# Patient Record
Sex: MALE | URBAN - METROPOLITAN AREA
[De-identification: names, ages, dates, MRNs, and addresses within clinical notes are randomized per-mention and may not be internally consistent; named-entity substitution may affect disease eponyms.]

---

## 2017-02-15 ENCOUNTER — IMPORTED ENCOUNTER (OUTPATIENT)
Dept: URBAN - METROPOLITAN AREA CLINIC 31 | Facility: CLINIC | Age: 72
End: 2017-02-15

## 2017-02-15 PROBLEM — B02.33: Noted: 2017-02-15

## 2017-02-15 PROCEDURE — 99204 OFFICE O/P NEW MOD 45 MIN: CPT

## 2017-02-15 NOTE — PATIENT DISCUSSION
1.  Zoster keratitis: Start Brooke Bull 5x/d for a week 3x/d for 3-4 days then DC Pred decrease to 2x/d add Valacyclovir 500mg qd. REfill Tylenol #3 for pain if persists refer to pain specialist.  Going back to [place] at end of month. 2.  Return for an appointment in 2 weeks for office call. with Dr. Tejas Shelby.

## 2017-02-28 ENCOUNTER — IMPORTED ENCOUNTER (OUTPATIENT)
Dept: URBAN - METROPOLITAN AREA CLINIC 31 | Facility: CLINIC | Age: 72
End: 2017-02-28

## 2017-02-28 PROBLEM — B02.33: Noted: 2017-02-28

## 2017-02-28 PROCEDURE — 99213 OFFICE O/P EST LOW 20 MIN: CPT

## 2017-02-28 NOTE — PATIENT DISCUSSION
Zoster keratitis: Improving unable to tolerate oral Valacyclovir. Zirgan 2x/d for 4 days then qd tears 4x/d PRED 2x/d. Driving back to [place] will refer to Dr Frederick Rodríguez.

## 2017-05-28 PROBLEM — D63.1 ANEMIA IN CHRONIC KIDNEY DISEASE: Status: ACTIVE | Noted: 2017-05-28

## 2017-05-28 PROBLEM — D84.9 IMMUNOSUPPRESSED STATUS (HCC): Status: ACTIVE | Noted: 2017-05-28

## 2017-05-28 PROBLEM — N18.9 ANEMIA IN CHRONIC KIDNEY DISEASE: Status: ACTIVE | Noted: 2017-05-28

## 2017-05-28 PROBLEM — Z94.0 HISTORY OF KIDNEY TRANSPLANT: Status: ACTIVE | Noted: 2017-05-28

## 2017-05-28 PROBLEM — D53.9 MACROCYTIC ANEMIA: Status: ACTIVE | Noted: 2017-05-28

## 2017-05-31 ENCOUNTER — OFFICE VISIT (OUTPATIENT)
Dept: CARDIOLOGY CLINIC | Age: 72
End: 2017-05-31

## 2017-05-31 VITALS
HEIGHT: 72 IN | BODY MASS INDEX: 21.94 KG/M2 | SYSTOLIC BLOOD PRESSURE: 130 MMHG | OXYGEN SATURATION: 98 % | DIASTOLIC BLOOD PRESSURE: 52 MMHG | HEART RATE: 60 BPM | WEIGHT: 162 LBS

## 2017-05-31 DIAGNOSIS — R60.0 LOCALIZED EDEMA: Primary | ICD-10-CM

## 2017-05-31 DIAGNOSIS — E78.49 OTHER HYPERLIPIDEMIA: ICD-10-CM

## 2017-05-31 DIAGNOSIS — I65.23 OCCLUSION AND STENOSIS OF BILATERAL CAROTID ARTERIES: ICD-10-CM

## 2017-05-31 DIAGNOSIS — R07.89 OTHER CHEST PAIN: ICD-10-CM

## 2017-05-31 DIAGNOSIS — E11.9 TYPE 2 DIABETES MELLITUS WITHOUT COMPLICATION, UNSPECIFIED LONG TERM INSULIN USE STATUS: ICD-10-CM

## 2017-05-31 PROCEDURE — G8427 DOCREV CUR MEDS BY ELIG CLIN: HCPCS | Performed by: NURSE PRACTITIONER

## 2017-05-31 PROCEDURE — 93000 ELECTROCARDIOGRAM COMPLETE: CPT | Performed by: NURSE PRACTITIONER

## 2017-05-31 PROCEDURE — 4040F PNEUMOC VAC/ADMIN/RCVD: CPT | Performed by: NURSE PRACTITIONER

## 2017-05-31 PROCEDURE — 3046F HEMOGLOBIN A1C LEVEL >9.0%: CPT | Performed by: NURSE PRACTITIONER

## 2017-05-31 PROCEDURE — 1123F ACP DISCUSS/DSCN MKR DOCD: CPT | Performed by: NURSE PRACTITIONER

## 2017-05-31 PROCEDURE — G8599 NO ASA/ANTIPLAT THER USE RNG: HCPCS | Performed by: NURSE PRACTITIONER

## 2017-05-31 PROCEDURE — 99214 OFFICE O/P EST MOD 30 MIN: CPT | Performed by: NURSE PRACTITIONER

## 2017-05-31 PROCEDURE — 3017F COLORECTAL CA SCREEN DOC REV: CPT | Performed by: NURSE PRACTITIONER

## 2017-05-31 PROCEDURE — G8419 CALC BMI OUT NRM PARAM NOF/U: HCPCS | Performed by: NURSE PRACTITIONER

## 2017-05-31 PROCEDURE — 1036F TOBACCO NON-USER: CPT | Performed by: NURSE PRACTITIONER

## 2017-06-28 ENCOUNTER — TELEPHONE (OUTPATIENT)
Dept: CARDIOLOGY CLINIC | Age: 72
End: 2017-06-28

## 2017-06-28 ENCOUNTER — OFFICE VISIT (OUTPATIENT)
Dept: CARDIOLOGY CLINIC | Age: 72
End: 2017-06-28

## 2017-06-28 VITALS
HEIGHT: 72 IN | HEART RATE: 56 BPM | DIASTOLIC BLOOD PRESSURE: 56 MMHG | BODY MASS INDEX: 21.4 KG/M2 | OXYGEN SATURATION: 98 % | SYSTOLIC BLOOD PRESSURE: 128 MMHG | WEIGHT: 158 LBS

## 2017-06-28 DIAGNOSIS — I65.23 OCCLUSION AND STENOSIS OF BILATERAL CAROTID ARTERIES: Primary | ICD-10-CM

## 2017-06-28 DIAGNOSIS — R07.89 OTHER CHEST PAIN: ICD-10-CM

## 2017-06-28 DIAGNOSIS — R60.0 LOCALIZED EDEMA: ICD-10-CM

## 2017-06-28 DIAGNOSIS — E78.49 OTHER HYPERLIPIDEMIA: ICD-10-CM

## 2017-06-28 DIAGNOSIS — E11.9 TYPE 2 DIABETES MELLITUS WITHOUT COMPLICATION, UNSPECIFIED LONG TERM INSULIN USE STATUS: ICD-10-CM

## 2017-06-28 PROCEDURE — 1123F ACP DISCUSS/DSCN MKR DOCD: CPT | Performed by: NURSE PRACTITIONER

## 2017-06-28 PROCEDURE — 3017F COLORECTAL CA SCREEN DOC REV: CPT | Performed by: NURSE PRACTITIONER

## 2017-06-28 PROCEDURE — 99214 OFFICE O/P EST MOD 30 MIN: CPT | Performed by: NURSE PRACTITIONER

## 2017-06-28 PROCEDURE — G8427 DOCREV CUR MEDS BY ELIG CLIN: HCPCS | Performed by: NURSE PRACTITIONER

## 2017-06-28 PROCEDURE — G8599 NO ASA/ANTIPLAT THER USE RNG: HCPCS | Performed by: NURSE PRACTITIONER

## 2017-06-28 PROCEDURE — G8420 CALC BMI NORM PARAMETERS: HCPCS | Performed by: NURSE PRACTITIONER

## 2017-06-28 PROCEDURE — 4040F PNEUMOC VAC/ADMIN/RCVD: CPT | Performed by: NURSE PRACTITIONER

## 2017-06-28 PROCEDURE — 3046F HEMOGLOBIN A1C LEVEL >9.0%: CPT | Performed by: NURSE PRACTITIONER

## 2017-06-28 PROCEDURE — 1036F TOBACCO NON-USER: CPT | Performed by: NURSE PRACTITIONER

## 2017-07-21 ENCOUNTER — TELEPHONE (OUTPATIENT)
Dept: CARDIOLOGY CLINIC | Age: 72
End: 2017-07-21

## 2017-07-21 DIAGNOSIS — E78.5 HYPERLIPIDEMIA, UNSPECIFIED HYPERLIPIDEMIA TYPE: Primary | ICD-10-CM

## 2017-08-07 ENCOUNTER — TELEPHONE (OUTPATIENT)
Dept: CARDIOLOGY CLINIC | Age: 72
End: 2017-08-07

## 2017-12-11 ENCOUNTER — OFFICE VISIT (OUTPATIENT)
Dept: CARDIOLOGY CLINIC | Age: 72
End: 2017-12-11

## 2017-12-11 VITALS
SYSTOLIC BLOOD PRESSURE: 138 MMHG | HEART RATE: 56 BPM | BODY MASS INDEX: 23.57 KG/M2 | DIASTOLIC BLOOD PRESSURE: 60 MMHG | WEIGHT: 174 LBS | HEIGHT: 72 IN

## 2017-12-11 DIAGNOSIS — E11.9 TYPE 2 DIABETES MELLITUS WITHOUT COMPLICATION, UNSPECIFIED LONG TERM INSULIN USE STATUS: ICD-10-CM

## 2017-12-11 DIAGNOSIS — E78.2 MIXED HYPERLIPIDEMIA: ICD-10-CM

## 2017-12-11 DIAGNOSIS — I10 ESSENTIAL HYPERTENSION: Primary | ICD-10-CM

## 2017-12-11 DIAGNOSIS — I65.23 OCCLUSION AND STENOSIS OF BILATERAL CAROTID ARTERIES: ICD-10-CM

## 2017-12-11 DIAGNOSIS — R60.0 LOCALIZED EDEMA: ICD-10-CM

## 2017-12-11 PROCEDURE — 1123F ACP DISCUSS/DSCN MKR DOCD: CPT | Performed by: NURSE PRACTITIONER

## 2017-12-11 PROCEDURE — 3046F HEMOGLOBIN A1C LEVEL >9.0%: CPT | Performed by: NURSE PRACTITIONER

## 2017-12-11 PROCEDURE — 1036F TOBACCO NON-USER: CPT | Performed by: NURSE PRACTITIONER

## 2017-12-11 PROCEDURE — G8427 DOCREV CUR MEDS BY ELIG CLIN: HCPCS | Performed by: NURSE PRACTITIONER

## 2017-12-11 PROCEDURE — 3017F COLORECTAL CA SCREEN DOC REV: CPT | Performed by: NURSE PRACTITIONER

## 2017-12-11 PROCEDURE — G8599 NO ASA/ANTIPLAT THER USE RNG: HCPCS | Performed by: NURSE PRACTITIONER

## 2017-12-11 PROCEDURE — 4040F PNEUMOC VAC/ADMIN/RCVD: CPT | Performed by: NURSE PRACTITIONER

## 2017-12-11 PROCEDURE — 99214 OFFICE O/P EST MOD 30 MIN: CPT | Performed by: NURSE PRACTITIONER

## 2017-12-11 PROCEDURE — G8420 CALC BMI NORM PARAMETERS: HCPCS | Performed by: NURSE PRACTITIONER

## 2017-12-11 PROCEDURE — G8484 FLU IMMUNIZE NO ADMIN: HCPCS | Performed by: NURSE PRACTITIONER

## 2017-12-11 RX ORDER — OMEPRAZOLE 20 MG/1
20 CAPSULE, DELAYED RELEASE ORAL 2 TIMES DAILY
COMMUNITY

## 2017-12-11 NOTE — PROGRESS NOTES
[facility]     Outpatient Follow Up Note    CHIEF COMPLAINT / HPI:  Follow Up secondary to edema/ HTN/  hyperlipidemia/ diabetes       Margi Epstein is 67 y.o. male who presents today for a routine follow  related to the above mentioned issues. Subjective:   Since the time of last office visit the patient admits their symptoms have worsening. HPI: Patient has a history of eft kidney transplant (7/2007) on immunosuppressant medications for this (mycophenolate and sirolimus), chronic renal insufficiency and history of gastric bypass surgery (2/2007). Patient is being seen today due to routine follow up. He is doing well. He denies any chest pain, palpitations, SOB, dizziness, or edema. His weight is stable. His history of kidney transplant patient is followed per renal every two months. They closely monitored his lab work. He is followed per hematology due to anemia. With regard to medication therapy the patient has been compliant with prescribed regimen. They have tolerated therapy to date.      Past Medical History:   Diagnosis Date    Diabetes mellitus ([facility] [address]     Hyperlipidemia     Hypertension     Kidney failure     Shingles 01/2017    Skin cancer      Social History:    History   Smoking Status    Former Smoker   Smokeless Tobacco    Not on file     Current Medications:  Current Outpatient Prescriptions   Medication Sig Dispense Refill    EPOETIN ESTEBAN IJ Inject as directed Indications: not sure on dosage      calcium carbonate (OYSTER SHELL CALCIUM 500 MG) 1250 (500 CA) MG tablet Take 1 tablet by mouth daily      acetaminophen (TYLENOL) 325 MG tablet Take 650 mg by mouth every 6 hours as needed for Pain      gabapentin (NEURONTIN) 100 MG capsule Take 100 mg by mouth 3 times daily      prednisoLONE acetate (PRED FORTE) 1 % ophthalmic suspension 1 drop 4 times daily      amLODIPine (NORVASC) 10 MG tablet Take 10 mg by mouth daily      Multiple Vitamins-Minerals (OCUVITE PRESERVISION PO) Take 1 tablet by mouth 2 times daily      sirolimus (RAPAMUNE) 1 MG tablet Take 2 mg by mouth daily       benazepril (LOTENSIN) 20 MG tablet Take 20 mg by mouth daily.  hydrochlorothiazide (HYDRODIURIL) 25 MG tablet Take 12.5 mg by mouth daily.  sodium bicarbonate 650 MG tablet Take 650 mg by mouth 3 times daily.  ammonium lactate (LAC-HYDRIN) 12 % lotion Apply  topically as needed for Dry Skin. Apply topically as needed.  vitamin B-12 (CYANOCOBALAMIN) 500 MCG tablet Take 500 mcg by mouth daily.  Cholecalciferol (VITAMIN D3) 2000 UNITS CAPS Take by mouth daily       sildenafil (VIAGRA) 50 MG tablet Take 25 mg by mouth as needed for Erectile Dysfunction.  diphenoxylate-atropine (LOMOTIL) 2.5-0.025 MG per tablet Take 1 tablet by mouth 2 times daily       aspirin EC 81 MG EC tablet Take 1 tablet by mouth daily. 30 tablet 3    predniSONE (DELTASONE) 1 MG tablet Take 2.5 mg by mouth daily       mycophenolate (CELLCEPT) 250 MG capsule Take 500 mg by mouth 2 times daily.  pantoprazole (PROTONIX) 40 MG tablet Take 20 mg by mouth 2 times daily.  carvedilol (COREG) 6.25 MG tablet Take 6.25 mg by mouth 2 times daily       Multiple Vitamins-Minerals (MULTIVITAMIN,TX-MINERALS) tablet Take 1 tablet by mouth daily.  folic acid (FOLVITE) 1 MG tablet Take 400 mcg by mouth daily.  ferrous sulfate 325 (65 FE) MG tablet Take 130 mg by mouth daily        No current facility-administered medications for this visit. REVIEW OF SYSTEMS:   CONSTITUTIONAL: No major weight gain or loss, fatigue, weakness, night sweats or fever. There's been no change in energy level, sleep pattern, or activity level. HEENT: No new vision difficulties or ringing in the ears. RESPIRATORY: No new SOB, PND, orthopnea or cough. CARDIOVASCULAR: See HPI  GI: No nausea, vomiting, diarrhea, constipation, abdominal pain or changes in bowel habits.   : No urinary frequency, 08/01/2017     Lab Results   Component Value Date    HDL 52 08/01/2017     Lab Results   Component Value Date    LDLCALC 69 08/01/2017     No results found for: LABVLDL  Radiology Review:  Pertinent images / reports were reviewed as a part of this visit and reveals the following:  ECHO : 6/ 2017  LV function normal : 55-60%  Mild LVH  Minimal MR  Minimal TR     Myoview stress test 2015    Conclusions        Summary    Normal myocardial perfusion study.    Normal LV function.    One 4 beat run of asymptomatic ventricular tachycardia. 5/31/17 EKG showed SB with 1st AV block no change from prior EKG in 2014- reviewed by me     Assessment:   Edema- patient denies any edema at today's visit,    Recent labs showed creat 1.8, patient s/p renal transplant- followed per renal  Currently on HCTZ- managed per renal    Hypertension  Today's B/P- 138/60  Stable  Continue current medications    [facility]     Diabetes--  [facility]     Carotid disease--2012 bilateral less than 40%                        Carotids 12/2014: 16-49% bilateral ICA's                        Carotids 6/2016:  <50% bilateral ICA's                       Carotid 6/17: < 50% right ICA, 50-69% left ICA                       Carotid  12/17: < 50 % BICA                        Plan: repeat carotid ultrasound  in one year     Chest pain-- GXT myoview 12/2015 nml perfusion, nml EF, 4 beats of asymptomatic VT                        Denies any anginal symptoms     Patient is stable since last office visit    Plan:   Continue current medications  Follow up in six months      I have addressed the patient's cardiac risk factors and adjusted pharmacologic treatment as needed. In addition, I have reinforced the need for patient directed risk factor modification. Further evaluation will be based upon the patient's clinical course and testing results. All questions and concerns were addressed to the patient/family.  Alternatives to treatment were discussed. The patient  currently  is not smoking. The risks related to smoking were reviewed with the patient. Recommend maintaining a smoke-free lifestyle. Products available for smoking cessation were discussed. Thank you for allowing to us to participate in the care of Dayna Spain CNP    Mail letter sent to PCP

## 2018-06-19 ENCOUNTER — OFFICE VISIT (OUTPATIENT)
Dept: CARDIOLOGY CLINIC | Age: 73
End: 2018-06-19

## 2018-06-19 VITALS
HEART RATE: 56 BPM | WEIGHT: 167.7 LBS | BODY MASS INDEX: 22.71 KG/M2 | HEIGHT: 72 IN | SYSTOLIC BLOOD PRESSURE: 118 MMHG | DIASTOLIC BLOOD PRESSURE: 60 MMHG

## 2018-06-19 DIAGNOSIS — I73.9 PVD (PERIPHERAL VASCULAR DISEASE) (HCC): ICD-10-CM

## 2018-06-19 DIAGNOSIS — I65.23 OCCLUSION AND STENOSIS OF BILATERAL CAROTID ARTERIES: Primary | ICD-10-CM

## 2018-06-19 DIAGNOSIS — E78.2 MIXED HYPERLIPIDEMIA: ICD-10-CM

## 2018-06-19 DIAGNOSIS — I10 ESSENTIAL HYPERTENSION: ICD-10-CM

## 2018-06-19 PROCEDURE — 99213 OFFICE O/P EST LOW 20 MIN: CPT | Performed by: INTERNAL MEDICINE

## 2018-06-19 PROCEDURE — G8420 CALC BMI NORM PARAMETERS: HCPCS | Performed by: INTERNAL MEDICINE

## 2018-06-19 PROCEDURE — G8599 NO ASA/ANTIPLAT THER USE RNG: HCPCS | Performed by: INTERNAL MEDICINE

## 2018-06-19 PROCEDURE — 1036F TOBACCO NON-USER: CPT | Performed by: INTERNAL MEDICINE

## 2018-06-19 PROCEDURE — 4040F PNEUMOC VAC/ADMIN/RCVD: CPT | Performed by: INTERNAL MEDICINE

## 2018-06-19 PROCEDURE — G8427 DOCREV CUR MEDS BY ELIG CLIN: HCPCS | Performed by: INTERNAL MEDICINE

## 2018-06-19 PROCEDURE — 1123F ACP DISCUSS/DSCN MKR DOCD: CPT | Performed by: INTERNAL MEDICINE

## 2018-06-19 PROCEDURE — 3017F COLORECTAL CA SCREEN DOC REV: CPT | Performed by: INTERNAL MEDICINE

## 2018-07-03 ENCOUNTER — TELEPHONE (OUTPATIENT)
Dept: CARDIOLOGY CLINIC | Age: 73
End: 2018-07-03

## 2019-02-15 ENCOUNTER — OFFICE VISIT (OUTPATIENT)
Dept: CARDIOLOGY CLINIC | Age: 74
End: 2019-02-15
Payer: MEDICARE

## 2019-02-15 VITALS
BODY MASS INDEX: 23.57 KG/M2 | HEIGHT: 72 IN | SYSTOLIC BLOOD PRESSURE: 138 MMHG | DIASTOLIC BLOOD PRESSURE: 58 MMHG | WEIGHT: 174 LBS | HEART RATE: 52 BPM

## 2019-02-15 DIAGNOSIS — E11.9 TYPE 2 DIABETES MELLITUS WITHOUT COMPLICATION, UNSPECIFIED WHETHER LONG TERM INSULIN USE (HCC): ICD-10-CM

## 2019-02-15 DIAGNOSIS — R07.89 OTHER CHEST PAIN: ICD-10-CM

## 2019-02-15 DIAGNOSIS — E78.2 MIXED HYPERLIPIDEMIA: ICD-10-CM

## 2019-02-15 DIAGNOSIS — I10 ESSENTIAL HYPERTENSION: Primary | ICD-10-CM

## 2019-02-15 DIAGNOSIS — I73.9 PVD (PERIPHERAL VASCULAR DISEASE) (HCC): ICD-10-CM

## 2019-02-15 PROCEDURE — G8484 FLU IMMUNIZE NO ADMIN: HCPCS | Performed by: INTERNAL MEDICINE

## 2019-02-15 PROCEDURE — 4040F PNEUMOC VAC/ADMIN/RCVD: CPT | Performed by: INTERNAL MEDICINE

## 2019-02-15 PROCEDURE — 1101F PT FALLS ASSESS-DOCD LE1/YR: CPT | Performed by: INTERNAL MEDICINE

## 2019-02-15 PROCEDURE — G8420 CALC BMI NORM PARAMETERS: HCPCS | Performed by: INTERNAL MEDICINE

## 2019-02-15 PROCEDURE — 99214 OFFICE O/P EST MOD 30 MIN: CPT | Performed by: INTERNAL MEDICINE

## 2019-02-15 PROCEDURE — G8599 NO ASA/ANTIPLAT THER USE RNG: HCPCS | Performed by: INTERNAL MEDICINE

## 2019-02-15 PROCEDURE — 1036F TOBACCO NON-USER: CPT | Performed by: INTERNAL MEDICINE

## 2019-02-15 PROCEDURE — 3046F HEMOGLOBIN A1C LEVEL >9.0%: CPT | Performed by: INTERNAL MEDICINE

## 2019-02-15 PROCEDURE — 2022F DILAT RTA XM EVC RTNOPTHY: CPT | Performed by: INTERNAL MEDICINE

## 2019-02-15 PROCEDURE — G8427 DOCREV CUR MEDS BY ELIG CLIN: HCPCS | Performed by: INTERNAL MEDICINE

## 2019-02-15 PROCEDURE — 1123F ACP DISCUSS/DSCN MKR DOCD: CPT | Performed by: INTERNAL MEDICINE

## 2019-02-15 PROCEDURE — 3017F COLORECTAL CA SCREEN DOC REV: CPT | Performed by: INTERNAL MEDICINE

## 2019-02-19 ENCOUNTER — PROCEDURE VISIT (OUTPATIENT)
Dept: CARDIOLOGY CLINIC | Age: 74
End: 2019-02-19

## 2019-02-19 DIAGNOSIS — R07.89 OTHER CHEST PAIN: ICD-10-CM

## 2019-02-19 LAB
LV EF: 50 %
LVEF MODALITY: NORMAL

## 2020-01-31 ENCOUNTER — OFFICE VISIT (OUTPATIENT)
Dept: CARDIOLOGY CLINIC | Age: 75
End: 2020-01-31
Payer: MEDICARE

## 2020-01-31 ENCOUNTER — TELEPHONE (OUTPATIENT)
Dept: CARDIOLOGY CLINIC | Age: 75
End: 2020-01-31

## 2020-01-31 VITALS
WEIGHT: 163.1 LBS | HEART RATE: 46 BPM | OXYGEN SATURATION: 95 % | DIASTOLIC BLOOD PRESSURE: 60 MMHG | SYSTOLIC BLOOD PRESSURE: 120 MMHG | BODY MASS INDEX: 22.09 KG/M2 | HEIGHT: 72 IN

## 2020-01-31 PROCEDURE — 3017F COLORECTAL CA SCREEN DOC REV: CPT | Performed by: INTERNAL MEDICINE

## 2020-01-31 PROCEDURE — 4040F PNEUMOC VAC/ADMIN/RCVD: CPT | Performed by: INTERNAL MEDICINE

## 2020-01-31 PROCEDURE — 1123F ACP DISCUSS/DSCN MKR DOCD: CPT | Performed by: INTERNAL MEDICINE

## 2020-01-31 PROCEDURE — 1036F TOBACCO NON-USER: CPT | Performed by: INTERNAL MEDICINE

## 2020-01-31 PROCEDURE — 93000 ELECTROCARDIOGRAM COMPLETE: CPT | Performed by: INTERNAL MEDICINE

## 2020-01-31 PROCEDURE — G8484 FLU IMMUNIZE NO ADMIN: HCPCS | Performed by: INTERNAL MEDICINE

## 2020-01-31 PROCEDURE — 99213 OFFICE O/P EST LOW 20 MIN: CPT | Performed by: INTERNAL MEDICINE

## 2020-01-31 PROCEDURE — G8427 DOCREV CUR MEDS BY ELIG CLIN: HCPCS | Performed by: INTERNAL MEDICINE

## 2020-01-31 PROCEDURE — G8420 CALC BMI NORM PARAMETERS: HCPCS | Performed by: INTERNAL MEDICINE

## 2020-01-31 RX ORDER — CARVEDILOL 3.12 MG/1
6.25 TABLET ORAL 2 TIMES DAILY
Qty: 90 TABLET | Refills: 3 | Status: SHIPPED | OUTPATIENT
Start: 2020-01-31 | End: 2020-01-31

## 2020-01-31 RX ORDER — CARVEDILOL 3.12 MG/1
3.12 TABLET ORAL 2 TIMES DAILY
Qty: 180 TABLET | Refills: 3 | Status: SHIPPED | OUTPATIENT
Start: 2020-01-31 | End: 2022-02-18 | Stop reason: SINTOL

## 2020-01-31 NOTE — PROGRESS NOTES
[facility]   Cardiac Follow up    Referring Provider:  Shereen Oliva     Chief Complaint   Patient presents with    1 Year Follow Up     Patient return to office for yearly visit     Hypertension    Hyperlipidemia        History of Present Illness:  Mr. Danika Couch is a 75 y.o. gentleman here today in follow up. He has a history of hypertension, hyperlipidemia, diabetes, had a renal transplant in 2007. Also has mild carotid disease. [place] [facility] [place] and walks 3 miles on the track. He lifts weights on Mon Wed and Fri. He has no change in exercise tolerance. Today he states he is doing well. He has no chest pain,sob palpitations or dizziness. Does have some fatigue . He has no orhopnea    Patient is compliant  with medication and is tolerating . Has not had any spontaneous bleeding, but does have bruising      Past Medical History:   has a past medical history of Diabetes mellitus ([address] Hyperlipidemia, Hypertension, Kidney failure, Shingles, and Skin cancer. Surgical History:   has a past surgical history that includes hernia repair; Gastric bypass surgery; Appendectomy; Cholecystectomy; Cataract removal; Kidney removal; and Prostate biopsy. Social History:   reports that he has quit smoking. He has never used smokeless tobacco. He reports current alcohol use. He reports that he does not use drugs. Family History:  family history includes Diabetes in his brother. Home Medications:  Outpatient Encounter Medications as of 1/31/2020   Medication Sig Dispense Refill    bevacizumab (AVASTIN) 2.5 mg/0.1 ml syringe Inject 2.5 mg into the eye once Shot in right eye every 4 wks.       omeprazole (PRILOSEC) 20 MG delayed release capsule Take 20 mg by mouth 2 times daily      EPOETIN ESTEBAN IJ Inject as directed Indications: not sure on dosage      calcium carbonate (OYSTER SHELL CALCIUM 500 MG) 1250 (500 CA) MG tablet Take 1 tablet by mouth daily      acetaminophen (TYLENOL) 325 MG tablet Take 650 mg by mouth every 6 hours as needed for Pain      gabapentin (NEURONTIN) 100 MG capsule Take 100 mg by mouth 3 times daily      amLODIPine (NORVASC) 10 MG tablet Take 5 mg by mouth daily       Multiple Vitamins-Minerals (OCUVITE PRESERVISION PO) Take 1 tablet by mouth 2 times daily      sirolimus (RAPAMUNE) 1 MG tablet Take 1 mg by mouth 4 times daily       benazepril (LOTENSIN) 20 MG tablet Take 20 mg by mouth daily.  hydrochlorothiazide (HYDRODIURIL) 25 MG tablet Take 12.5 mg by mouth daily.  sodium bicarbonate 650 MG tablet Take 650 mg by mouth 3 times daily.  ammonium lactate (LAC-HYDRIN) 12 % lotion Apply  topically as needed for Dry Skin. Apply topically as needed.  vitamin B-12 (CYANOCOBALAMIN) 500 MCG tablet Take 500 mcg by mouth daily.  Cholecalciferol (VITAMIN D3) 2000 UNITS CAPS Take by mouth daily       diphenoxylate-atropine (LOMOTIL) 2.5-0.025 MG per tablet Take 1 tablet by mouth 2 times daily       aspirin EC 81 MG EC tablet Take 1 tablet by mouth daily. 30 tablet 3    predniSONE (DELTASONE) 1 MG tablet Take 2.5 mg by mouth daily       mycophenolate (CELLCEPT) 250 MG capsule Take 500 mg by mouth 2 times daily.  carvedilol (COREG) 6.25 MG tablet Take 6.25 mg by mouth 2 times daily       Multiple Vitamins-Minerals (MULTIVITAMIN,TX-MINERALS) tablet Take 1 tablet by mouth daily.  folic acid (FOLVITE) 1 MG tablet Take 400 mcg by mouth daily.  ferrous sulfate 325 (65 FE) MG tablet Take 130 mg by mouth daily       [DISCONTINUED] sildenafil (VIAGRA) 50 MG tablet Take 25 mg by mouth as needed for Erectile Dysfunction. No facility-administered encounter medications on file as of 1/31/2020. Allergies:  Patient has no known allergies. Review of Systems:   · Constitutional: there has been no unanticipated weight loss. There's been no change in energy level, sleep pattern, or activity level. · Eyes: No visual changes or diplopia. No scleral icterus. · ENT: No Headaches, hearing loss or vertigo. No mouth sores or sore throat. · Cardiovascular: Reviewed in HPI  · Respiratory: No cough or wheezing, no sputum production. No hematemesis. · Gastrointestinal: No abdominal pain, appetite loss, blood in stools. No change in bowel or bladder habits. · Genitourinary: No dysuria, trouble voiding, or hematuria. · Musculoskeletal:  No gait disturbance, weakness or joint complaints. · Integumentary: No rash or pruritis. · Neurological: No headache, diplopia, change in muscle strength, numbness or tingling. No change in gait, balance, coordination, mood, affect, memory, mentation, behavior. · Psychiatric: No anxiety, no depression. · Endocrine: No malaise, fatigue or temperature intolerance. No excessive thirst, fluid intake, or urination. No tremor. · Hematologic/Lymphatic: No abnormal bruising or bleeding, blood clots or swollen lymph nodes. · Allergic/Immunologic: No nasal congestion or hives. Physical Examination:    Vitals:    01/31/20 0918   BP: 120/60   Pulse: (!) 46   SpO2: 95%   Constitutional and General Appearance:   . NAD   SKIN:  .     Warm and dry  EYES:    .     EOMI  Neck:   . Normal carotid contour  Respiratory:  Normal excursion and expansion without use of accessory muscles  Resp Auscultation: Normal breath sounds without dullness  Cardiovascular: The apical impulses not displaced  Heart tones are crisp and normal  Cervical veins are not engorged  Normal S1S2, No S3, No Murmur  Peripheral pulses are symmetrical and full  Extremities: There is no clubbing, cyanosis of the extremities.   No edema  Femoral Arteries: 2+ and equal  Pedal Pulses: 2+ and equal   Abdomen:  No masses or tenderness  Liver/Spleen: No Abnormalities Noted  Neurological/Psychiatric:  Alert and oriented in all spheres  Moves all extremities well  Exhibits normal gait balance and coordination  No

## 2020-02-19 ENCOUNTER — TELEPHONE (OUTPATIENT)
Dept: CARDIOLOGY CLINIC | Age: 75
End: 2020-02-19

## 2020-02-25 ENCOUNTER — TELEPHONE (OUTPATIENT)
Dept: CARDIOLOGY CLINIC | Age: 75
End: 2020-02-25

## 2020-02-25 NOTE — TELEPHONE ENCOUNTER
Called pt to let him know about his VL Carotid Duplex Doppler results. And LMOM for him to call back.      Per DGB   <50% Marko  Repeat in 6 months
Spoke to the pt-gave carotid results, and instructions.
Anna

## 2020-05-21 ENCOUNTER — TELEPHONE (OUTPATIENT)
Dept: CARDIOLOGY CLINIC | Age: 75
End: 2020-05-21

## 2021-02-16 ENCOUNTER — OFFICE VISIT (OUTPATIENT)
Dept: CARDIOLOGY CLINIC | Age: 76
End: 2021-02-16
Payer: MEDICARE

## 2021-02-16 VITALS
SYSTOLIC BLOOD PRESSURE: 126 MMHG | BODY MASS INDEX: 21.4 KG/M2 | DIASTOLIC BLOOD PRESSURE: 50 MMHG | HEART RATE: 52 BPM | HEIGHT: 72 IN | WEIGHT: 158 LBS

## 2021-02-16 DIAGNOSIS — I10 ESSENTIAL HYPERTENSION: Primary | ICD-10-CM

## 2021-02-16 DIAGNOSIS — R00.1 BRADYCARDIA: ICD-10-CM

## 2021-02-16 DIAGNOSIS — Z94.0 HISTORY OF KIDNEY TRANSPLANT: ICD-10-CM

## 2021-02-16 DIAGNOSIS — E78.2 MIXED HYPERLIPIDEMIA: ICD-10-CM

## 2021-02-16 DIAGNOSIS — I65.23 OCCLUSION AND STENOSIS OF BILATERAL CAROTID ARTERIES: ICD-10-CM

## 2021-02-16 DIAGNOSIS — N18.30 STAGE 3 CHRONIC KIDNEY DISEASE, UNSPECIFIED WHETHER STAGE 3A OR 3B CKD (HCC): ICD-10-CM

## 2021-02-16 PROCEDURE — 1036F TOBACCO NON-USER: CPT | Performed by: INTERNAL MEDICINE

## 2021-02-16 PROCEDURE — 99214 OFFICE O/P EST MOD 30 MIN: CPT | Performed by: INTERNAL MEDICINE

## 2021-02-16 PROCEDURE — 1123F ACP DISCUSS/DSCN MKR DOCD: CPT | Performed by: INTERNAL MEDICINE

## 2021-02-16 PROCEDURE — G8420 CALC BMI NORM PARAMETERS: HCPCS | Performed by: INTERNAL MEDICINE

## 2021-02-16 PROCEDURE — G8484 FLU IMMUNIZE NO ADMIN: HCPCS | Performed by: INTERNAL MEDICINE

## 2021-02-16 PROCEDURE — G8427 DOCREV CUR MEDS BY ELIG CLIN: HCPCS | Performed by: INTERNAL MEDICINE

## 2021-02-16 PROCEDURE — 4040F PNEUMOC VAC/ADMIN/RCVD: CPT | Performed by: INTERNAL MEDICINE

## 2021-02-16 NOTE — PATIENT INSTRUCTIONS
Call [place] doctor and ask what your carotid test showed (see if she wants to follow it)  No medication changes  Follow up as needed

## 2021-02-16 NOTE — LETTER
[address] [place] [id number]  Phone: [phone]  Fax: [phone]    No ref. provider found        February 16, 2021       Patient: Corky Patel   MR Number: [id number]   YOB: 1945   Date of Visit: 2/16/2021       Dear Dr. Cheryle Asp ref. provider found:    [facility]  Cardiac Consult     Referring Provider:  Isaías Murphy     Chief Complaint   Patient presents with    1 Year Follow Up    Hyperlipidemia    Hypertension        History of Present Illness:  67 y.o. male formally followed by Dr. Bernardo Real seen in f/u for HTN and carotid disease. He is doing well. He is followed at the [facility] by Dr. Jose L Charles. Active. No chest pain or dyspnea. Recent carotid doppler at the [facility] but unclear what it showed. Past Medical History:   has a past medical history of Diabetes mellitus ([address] Hyperlipidemia, Hypertension, Kidney failure, Shingles, and Skin cancer. Surgical History:   has a past surgical history that includes hernia repair; Gastric bypass surgery; Appendectomy; Cholecystectomy; Cataract removal; Kidney removal; Prostate biopsy; and Vocal Cord Surgery (01/2021). Social History:  Social History     Tobacco Use    Smoking status: Former Smoker    Smokeless tobacco: Never Used   Substance Use Topics    Alcohol use: Yes     Comment: SELDOM        Family History:  family history includes Diabetes in his brother. Allergies:  Patient has no known allergies. Home Medications:  Prior to Visit Medications    Medication Sig Taking? Authorizing Provider   carvedilol (COREG) 3.125 MG tablet Take 1 tablet by mouth 2 times daily Yes Nithin Jones MD   bevacizumab (AVASTIN) 2.5 mg/0.1 ml syringe Inject 2.5 mg into the eye once Shot in right eye every 4 wks.  Yes Historical Provider, MD   omeprazole (PRILOSEC) 20 MG delayed release capsule Take 20 mg by mouth 2 times daily Yes Historical Provider, MD EPOETIN ESTEBAN IJ Inject as directed Indications: not sure on dosage Yes Historical Provider, MD   calcium carbonate (OYSTER SHELL CALCIUM 500 MG) 1250 (500 CA) MG tablet Take 1 tablet by mouth daily Yes Historical Provider, MD   acetaminophen (TYLENOL) 325 MG tablet Take 650 mg by mouth every 6 hours as needed for Pain Yes Historical Provider, MD   gabapentin (NEURONTIN) 100 MG capsule Take 100 mg by mouth 3 times daily Yes Historical Provider, MD   amLODIPine (NORVASC) 10 MG tablet Take 5 mg by mouth daily  Yes Historical Provider, MD   Multiple Vitamins-Minerals (OCUVITE PRESERVISION PO) Take 1 tablet by mouth 2 times daily Yes Historical Provider, MD   sirolimus (RAPAMUNE) 1 MG tablet Take 1 mg by mouth 4 times daily  Yes Historical Provider, MD   benazepril (LOTENSIN) 20 MG tablet Take 20 mg by mouth daily. Yes Historical Provider, MD   hydrochlorothiazide (HYDRODIURIL) 25 MG tablet Take 12.5 mg by mouth daily. Yes Historical Provider, MD   sodium bicarbonate 650 MG tablet Take 650 mg by mouth 3 times daily. Yes Historical Provider, MD   vitamin B-12 (CYANOCOBALAMIN) 500 MCG tablet Take 500 mcg by mouth daily. Yes Historical Provider, MD   Cholecalciferol (VITAMIN D3) 2000 UNITS CAPS Take by mouth daily  Yes Historical Provider, MD   aspirin EC 81 MG EC tablet Take 1 tablet by mouth daily. Yes Yinkakieran Pepe MD   predniSONE (DELTASONE) 1 MG tablet Take 2.5 mg by mouth daily  Yes Historical Provider, MD   mycophenolate (CELLCEPT) 250 MG capsule Take 500 mg by mouth 2 times daily. Yes Historical Provider, MD   Multiple Vitamins-Minerals (MULTIVITAMIN,TX-MINERALS) tablet Take 1 tablet by mouth daily. Yes Historical Provider, MD   folic acid (FOLVITE) 1 MG tablet Take 400 mcg by mouth daily.  Yes Historical Provider, MD   ferrous sulfate 325 (65 FE) MG tablet Take 130 mg by mouth daily  Yes Historical Provider, MD ammonium lactate (LAC-HYDRIN) 12 % lotion Apply  topically as needed for Dry Skin. Apply topically as needed. Historical Provider, MD   diphenoxylate-atropine (LOMOTIL) 2.5-0.025 MG per tablet Take 1 tablet by mouth 2 times daily   Historical Provider, MD       [x] Medications and dosages reviewed. ROS:  [x]Full ROS obtained and negative except as mentioned in HPI      Physical Examination:    Vitals:    02/16/21 1039   BP: (!) 126/50   Site: Right Upper Arm   Position: Sitting   Cuff Size: Medium Adult   Pulse: 52   Weight: 158 lb (71.7 kg)   Height: 6' (1.829 m)        · GENERAL: Well developed, well nourished, No acute distress  · NEUROLOGICAL: Alert and oriented  · PSYCH: Calm affect  · SKIN: Warm and dry, No visible rash,   · EYES: Pupils equal and round, Sclera non-icteric,   · HENT:  External ears and nose unremarkable, mucus membranes moist  · MUSCULOSKELETAL: Normal cephalic, neck supple  · CAROTID: Normal upstroke, no bruits  · CARDIAC: JVP normal, Normal PMI, regular rate and rhythm, normal S1S2, no murmur, rub, or gallop  · RESPIRATORY: Normal respiratory effort, clear to auscultation bilaterally  · EXTREMITIES: No edema  · GASTROINTESTINAL: normal bowel sounds, soft, non-tender, No hepatomegaly     LABS   12/15/2021  Creat=2.18  H/H=9.8/31    5/2020  SAO=589    Assessment:       HTN:  Controlled  BP (!) 126/50 (Site: Right Upper Arm, Position: Sitting, Cuff Size: Medium Adult)   Pulse 52   Ht 6' (1.829 m)   Wt 158 lb (71.7 kg)   BMI 21.43 kg/m²   Continue current therapy    Hyperlipidemia:  TTF=590. Not on statin  Followed by PCP    Carotid Stenosis:  650-69 2020  Recent check through [place]  He will call to get result and have Dr. Bucky Peterson follow    CRI:   Stable. Creat=1.86  Followed by renal    Renal Transplant  10 yrs ago  Followed by nephrology  Stable    Bradycardia:  Stable. Asymptomatic. No intervention indicated at this time      Plan:  No current cardiac issues.   F/u PCP   F/u me prn Thank you for allowing me to participate in the care of this individual.      James Eng M.D., [address] [facility]          If you have questions, please do not hesitate to call me. I look forward to following Hal along with you. Sincerely,        Huong Mixon MD     providers:  Armani De Anda  [address] [facility] [address]  Via Mail

## 2021-02-16 NOTE — PROGRESS NOTES
[facility]  Cardiac Consult     Referring Provider:  Kenny Haley     Chief Complaint   Patient presents with    1 Year Follow Up    Hyperlipidemia    Hypertension        History of Present Illness:  67 y.o. male formally followed by Dr. Viviane Cat seen in f/u for HTN and carotid disease. He is doing well. He is followed at the [place] by Dr. Oly Cheng. Active. No chest pain or dyspnea. Recent carotid doppler at the [place] but unclear what it showed. Past Medical History:   has a past medical history of Diabetes mellitus ([address] Hyperlipidemia, Hypertension, Kidney failure, Shingles, and Skin cancer. Surgical History:   has a past surgical history that includes hernia repair; Gastric bypass surgery; Appendectomy; Cholecystectomy; Cataract removal; Kidney removal; Prostate biopsy; and Vocal Cord Surgery (01/2021). Social History:  Social History     Tobacco Use    Smoking status: Former Smoker    Smokeless tobacco: Never Used   Substance Use Topics    Alcohol use: Yes     Comment: SELDOM        Family History:  family history includes Diabetes in his brother. Allergies:  Patient has no known allergies. Home Medications:  Prior to Visit Medications    Medication Sig Taking? Authorizing Provider   carvedilol (COREG) 3.125 MG tablet Take 1 tablet by mouth 2 times daily Yes Chavo Mascorro MD   bevacizumab (AVASTIN) 2.5 mg/0.1 ml syringe Inject 2.5 mg into the eye once Shot in right eye every 4 wks.  Yes Historical Provider, MD   omeprazole (PRILOSEC) 20 MG delayed release capsule Take 20 mg by mouth 2 times daily Yes Historical Provider, MD   EPOETIN ESTEBAN IJ Inject as directed Indications: not sure on dosage Yes Historical Provider, MD   calcium carbonate (OYSTER SHELL CALCIUM 500 MG) 1250 (500 CA) MG tablet Take 1 tablet by mouth daily Yes Historical Provider, MD   acetaminophen (TYLENOL) 325 MG tablet Take 650 mg by mouth every 6 hours as needed for Pain Yes Historical Provider, MD   gabapentin (NEURONTIN) 100 MG capsule Take 100 mg by mouth 3 times daily Yes Historical Provider, MD   amLODIPine (NORVASC) 10 MG tablet Take 5 mg by mouth daily  Yes Historical Provider, MD   Multiple Vitamins-Minerals (OCUVITE PRESERVISION PO) Take 1 tablet by mouth 2 times daily Yes Historical Provider, MD   sirolimus (RAPAMUNE) 1 MG tablet Take 1 mg by mouth 4 times daily  Yes Historical Provider, MD   benazepril (LOTENSIN) 20 MG tablet Take 20 mg by mouth daily. Yes Historical Provider, MD   hydrochlorothiazide (HYDRODIURIL) 25 MG tablet Take 12.5 mg by mouth daily. Yes Historical Provider, MD   sodium bicarbonate 650 MG tablet Take 650 mg by mouth 3 times daily. Yes Historical Provider, MD   vitamin B-12 (CYANOCOBALAMIN) 500 MCG tablet Take 500 mcg by mouth daily. Yes Historical Provider, MD   Cholecalciferol (VITAMIN D3) 2000 UNITS CAPS Take by mouth daily  Yes Historical Provider, MD   aspirin EC 81 MG EC tablet Take 1 tablet by mouth daily. Yes Madison Wilburn MD   predniSONE (DELTASONE) 1 MG tablet Take 2.5 mg by mouth daily  Yes Historical Provider, MD   mycophenolate (CELLCEPT) 250 MG capsule Take 500 mg by mouth 2 times daily. Yes Historical Provider, MD   Multiple Vitamins-Minerals (MULTIVITAMIN,TX-MINERALS) tablet Take 1 tablet by mouth daily. Yes Historical Provider, MD   folic acid (FOLVITE) 1 MG tablet Take 400 mcg by mouth daily. Yes Historical Provider, MD   ferrous sulfate 325 (65 FE) MG tablet Take 130 mg by mouth daily  Yes Historical Provider, MD   ammonium lactate (LAC-HYDRIN) 12 % lotion Apply  topically as needed for Dry Skin. Apply topically as needed. Historical Provider, MD   diphenoxylate-atropine (LOMOTIL) 2.5-0.025 MG per tablet Take 1 tablet by mouth 2 times daily   Historical Provider, MD       [x] Medications and dosages reviewed.     ROS:  [x]Full ROS obtained and negative except as mentioned in HPI      Physical Examination:    Vitals:    02/16/21 1039   BP: (!) 126/50   Site: Right Upper Arm   Position: Sitting   Cuff Size: Medium Adult   Pulse: 52   Weight: 158 lb (71.7 kg)   Height: 6' (1.829 m)        · GENERAL: Well developed, well nourished, No acute distress  · NEUROLOGICAL: Alert and oriented  · PSYCH: Calm affect  · SKIN: Warm and dry, No visible rash,   · EYES: Pupils equal and round, Sclera non-icteric,   · HENT:  External ears and nose unremarkable, mucus membranes moist  · MUSCULOSKELETAL: Normal cephalic, neck supple  · CAROTID: Normal upstroke, no bruits  · CARDIAC: JVP normal, Normal PMI, regular rate and rhythm, normal S1S2, no murmur, rub, or gallop  · RESPIRATORY: Normal respiratory effort, clear to auscultation bilaterally  · EXTREMITIES: No edema  · GASTROINTESTINAL: normal bowel sounds, soft, non-tender, No hepatomegaly     LABS   12/15/2021  Creat=2.18  H/H=9.8/31    5/2020  IZS=710    Assessment:       HTN:  Controlled  BP (!) 126/50 (Site: Right Upper Arm, Position: Sitting, Cuff Size: Medium Adult)   Pulse 52   Ht 6' (1.829 m)   Wt 158 lb (71.7 kg)   BMI 21.43 kg/m²   Continue current therapy    Hyperlipidemia:  UQY=108. Not on statin  Followed by PCP    Carotid Stenosis:  650-69 2020  Recent check through [place]  He will call to get result and have Dr. Bruna Tirado follow    CRI:   Stable. Creat=1.86  Followed by renal    Renal Transplant  10 yrs ago  Followed by nephrology  Stable    Bradycardia:  Stable. Asymptomatic. No intervention indicated at this time      Plan:  No current cardiac issues.   F/u PCP   F/u me prn    Thank you for allowing me to participate in the care of this individual.      Alexandrea Hu M.D., [facility]

## 2021-02-16 NOTE — LETTER
[facility] Cardiology [facility]  [address]  Phone: [phone]  Fax: [phone]    Alfonso Ott MD        February 16, 2021     Petra Rahman  [address] [place] [address]    Patient: Noa Alicia  MR Number: [id number]  YOB: 1945  Date of Visit: 2/16/2021    Dear Dr. Petra Rahman:    Thank you for the request for consultation for Sg Larkin to me for the evaluation of ***. Below are the relevant portions of my assessment and plan of care. If you have questions, please do not hesitate to call me. I look forward to following Hal along with you.     Sincerely,        Alfonso Ott MD

## 2021-03-11 ENCOUNTER — TELEPHONE (OUTPATIENT)
Dept: CARDIOLOGY CLINIC | Age: 76
End: 2021-03-11

## 2021-03-11 NOTE — TELEPHONE ENCOUNTER
Jonathon calling from [facility] pt is having a procedure and needs the ok to stop the asprin fax [phone] pls call to advise thank you

## 2021-03-11 NOTE — TELEPHONE ENCOUNTER
Dr Gurmeet Hua saw in office 2/16/21 and patient does not have a cardiac problem so patient was to follow up with PCP and Dr Gurmeet Hua as needed. Asked SRUTHI from [place] to call PCP.

## 2021-11-30 ENCOUNTER — TELEPHONE (OUTPATIENT)
Dept: CARDIOLOGY CLINIC | Age: 76
End: 2021-11-30

## 2021-11-30 NOTE — TELEPHONE ENCOUNTER
Tried calling Juancarlos Camargo at [place] [facility]. Left voicemail. [facility] no longer routinely sees this patient as he does not have a cardiac problem. Patient may be on ASA due to carotid disease? If patient does in fact need cardiac clearance he can be sen Friday in [facility] by [facility]. Appt time held for him.

## 2021-12-01 NOTE — TELEPHONE ENCOUNTER
Rasheeda Corrales from [place] called back asking for Aj Montes stated she needs additional information such as and cardiac testing, echos, ekgs, Doppler  Etc faxed to F: [phone]

## 2021-12-03 NOTE — TELEPHONE ENCOUNTER
Spoke to Liliana Carrillo at the [facility]. Patient does NOT need cardiac clearance for his thyroplasty under [facility].

## 2021-12-03 NOTE — TELEPHONE ENCOUNTER
[facility] to [facility] does not cardiac [facility] as his surgery is scheduled for 12/7. Have an appt available today and Monday for him if so.

## 2022-01-11 ENCOUNTER — TELEPHONE (OUTPATIENT)
Dept: CARDIOLOGY CLINIC | Age: 77
End: 2022-01-11

## 2022-01-11 DIAGNOSIS — I65.23 OCCLUSION AND STENOSIS OF BILATERAL CAROTID ARTERIES: Primary | ICD-10-CM

## 2022-01-11 NOTE — TELEPHONE ENCOUNTER
Carotid US results reviewed by [facility]. Done at [facility] 65 ,50%. Per [facility]- stable, repeat in 1 year. LMOM for patient with results. Asked him to call back with any questions. Has fu 2/18/22.

## 2022-02-18 ENCOUNTER — OFFICE VISIT (OUTPATIENT)
Dept: CARDIOLOGY CLINIC | Age: 77
End: 2022-02-18
Payer: MEDICARE

## 2022-02-18 VITALS
BODY MASS INDEX: 19.37 KG/M2 | DIASTOLIC BLOOD PRESSURE: 60 MMHG | HEART RATE: 53 BPM | OXYGEN SATURATION: 99 % | SYSTOLIC BLOOD PRESSURE: 128 MMHG | WEIGHT: 143 LBS | HEIGHT: 72 IN

## 2022-02-18 DIAGNOSIS — I10 ESSENTIAL HYPERTENSION: Primary | ICD-10-CM

## 2022-02-18 DIAGNOSIS — I65.23 OCCLUSION AND STENOSIS OF BILATERAL CAROTID ARTERIES: ICD-10-CM

## 2022-02-18 DIAGNOSIS — R00.1 BRADYCARDIA: ICD-10-CM

## 2022-02-18 DIAGNOSIS — E78.2 MIXED HYPERLIPIDEMIA: ICD-10-CM

## 2022-02-18 DIAGNOSIS — R07.89 OTHER CHEST PAIN: ICD-10-CM

## 2022-02-18 PROCEDURE — 1123F ACP DISCUSS/DSCN MKR DOCD: CPT | Performed by: INTERNAL MEDICINE

## 2022-02-18 PROCEDURE — G8420 CALC BMI NORM PARAMETERS: HCPCS | Performed by: INTERNAL MEDICINE

## 2022-02-18 PROCEDURE — G8427 DOCREV CUR MEDS BY ELIG CLIN: HCPCS | Performed by: INTERNAL MEDICINE

## 2022-02-18 PROCEDURE — 93000 ELECTROCARDIOGRAM COMPLETE: CPT | Performed by: INTERNAL MEDICINE

## 2022-02-18 PROCEDURE — 4040F PNEUMOC VAC/ADMIN/RCVD: CPT | Performed by: INTERNAL MEDICINE

## 2022-02-18 PROCEDURE — G8484 FLU IMMUNIZE NO ADMIN: HCPCS | Performed by: INTERNAL MEDICINE

## 2022-02-18 PROCEDURE — 99214 OFFICE O/P EST MOD 30 MIN: CPT | Performed by: INTERNAL MEDICINE

## 2022-02-18 PROCEDURE — 1036F TOBACCO NON-USER: CPT | Performed by: INTERNAL MEDICINE

## 2022-02-18 RX ORDER — DICYCLOMINE HYDROCHLORIDE 10 MG/1
10 CAPSULE ORAL PRN
COMMUNITY

## 2022-02-18 NOTE — PROGRESS NOTES
[facility]  Cardiac Consult     Referring Provider:  Kandy Caballero     Chief Complaint   Patient presents with    1 Year Follow Up    Hypertension    Hyperlipidemia        History of Present Illness:  67 y.o. male formally followed by Dr. Aries Chacon seen in f/u for HTN and carotid disease. 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 He underwent craniectomy and resection. Followed by Dr. Cale Michelle. Repeat MRI 12/2021 with left parietal mass. He is doing fairly well. He has notice some left sided chest pain. Near anterior axillary line. \"Aches\" at time. Seems to occur with emotional stress. Occurring over last 4-5 months. Once per week. Past Medical History:   has a past medical history of Diabetes mellitus ([address] Hyperlipidemia, Hypertension, Kidney failure, Shingles, and Skin cancer. Surgical History:   has a past surgical history that includes hernia repair; Gastric bypass surgery; Appendectomy; Cholecystectomy; Cataract removal; Kidney removal; Prostate biopsy; and Vocal Cord Surgery (01/2021). Social History:  Social History     Tobacco Use    Smoking status: Former Smoker    Smokeless tobacco: Never Used   Substance Use Topics    Alcohol use: Yes     Comment: SELDOM        Family History:  family history includes Diabetes in his brother. Allergies:  Patient has no known allergies. Home Medications:  Prior to Visit Medications    Medication Sig Taking? Authorizing Provider   dicyclomine (BENTYL) 10 MG capsule Take 10 mg by mouth as needed Yes Historical Provider, MD   carvedilol (COREG) 3.125 MG tablet Take 1 tablet by mouth 2 times daily Yes Cynthia Phipps MD   bevacizumab (AVASTIN) 2.5 mg/0.1 ml syringe Inject 2.5 mg into the eye once Shot in right eye every 4 wks.  Yes Historical Provider, MD   omeprazole (PRILOSEC) 20 MG delayed release capsule Take 20 mg by mouth 2 times daily Yes Historical Provider, MD   EPOETIN ESTEBAN IJ Inject as directed Indications: not sure on dosage Yes Historical Provider, MD   calcium carbonate (OYSTER SHELL CALCIUM 500 MG) 1250 (500 CA) MG tablet Take 1 tablet by mouth daily Yes Historical Provider, MD   acetaminophen (TYLENOL) 325 MG tablet Take 650 mg by mouth every 6 hours as needed for Pain Yes Historical Provider, MD   gabapentin (NEURONTIN) 100 MG capsule Take 100 mg by mouth 3 times daily Yes Historical Provider, MD   Multiple Vitamins-Minerals (OCUVITE PRESERVISION PO) Take 1 tablet by mouth 2 times daily Yes Historical Provider, MD   sirolimus (RAPAMUNE) 1 MG tablet Take 1 mg by mouth 4 times daily  Yes Historical Provider, MD   hydrochlorothiazide (HYDRODIURIL) 25 MG tablet Take 12.5 mg by mouth daily. Yes Historical Provider, MD   Cholecalciferol (VITAMIN D3) 2000 UNITS CAPS Take by mouth daily  Yes Historical Provider, MD   aspirin EC 81 MG EC tablet Take 1 tablet by mouth daily. Yes Ward Tan MD   Multiple Vitamins-Minerals (MULTIVITAMIN,TX-MINERALS) tablet Take 1 tablet by mouth daily. Yes Historical Provider, MD       [x] Medications and dosages reviewed.     ROS:  [x]Full ROS obtained and negative except as mentioned in HPI      Physical Examination:    Vitals:    02/18/22 1032   BP: 128/60   Site: Right Upper Arm   Position: Sitting   Cuff Size: Medium Adult   Pulse: 53   SpO2: 99%   Weight: 143 lb (64.9 kg)   Height: 6' (1.829 m)        · GENERAL: Well developed, well nourished, No acute distress  · NEUROLOGICAL: Alert and oriented  · PSYCH: Calm affect  · SKIN: Warm and dry, No visible rash,   · EYES: Pupils equal and round, Sclera non-icteric,   · HENT:  External ears and nose unremarkable, mucus membranes moist  · MUSCULOSKELETAL: Normal cephalic, neck supple  · CAROTID: Normal upstroke, no bruits  · CARDIAC: JVP normal, Normal PMI, regular bradycardic rate and rhythm, normal S1S2, no murmur, rub, or gallop  · RESPIRATORY: Normal respiratory effort, clear to auscultation bilaterally  · EXTREMITIES: No edema  · GASTROINTESTINAL: normal bowel sounds, soft, non-tender, No hepatomegaly     Carotid Doppler 1/2022  Estimated diameter reduction of the right internal carotid artery and   bulb  is <50%. Estimated diameter reduction of the left internal carotid artery and   bulb is <50%. MRI 12/2021  Continued slight interval decrease in size of left parietal mass with stable surrounding mild vasogenic edema versus gliosis. Stable post surgical changes in the right cerebellar hemisphere. Stable atrophy and chronic white matter signal abnormality. Interval worsening of severe bilateral mastoid inflammatory change with stable and shoulder changes in the paranasal sinuses. .    EKG: Sinus bradycardia    Assessment:       HTN:  Controlled  /60 (Site: Right Upper Arm, Position: Sitting, Cuff Size: Medium Adult)   Pulse 53   Ht 6' (1.829 m)   Wt 143 lb (64.9 kg)   SpO2 99%   BMI 19.39 kg/m²   Stop coreg due to bradycardia    Hyperlipidemia:  RJC=473. Not on statin  Followed by PCP    Carotid Stenosis:  50-69 2020  Recent check through [address] [facility]  He will call to get result and have Dr. Fernando Richardson follow    CRI:   Stable. Creat=1.86  Followed by renal    Renal Transplant  10 yrs ago  Followed by nephrology  Stable    Bradycardia:  Stop coreg    Chest Pain:  Atypical. Likely stress. Try plain treadmill but need to stop coreg. Misbah protocol.       Plan:  Stop coreg  F/u with GXT    Thank you for allowing me to participate in the care of this individual.      Alexander Miller M.D., [facility]

## 2022-02-18 NOTE — LETTER
[address] Cardiology [facility] [address]  Phone: [phone]  Fax: [phone]    Prabhu Francisco MD    February 18, 2022     Conrado Urbano  [address] [address]    Patient: Dane Brizuela   MR Number: [id number]   YOB: 1945   Date of Visit: 2/18/2022       Dear Conrado Urbano:    Thank you for referring Meagan Chakraborty to me for evaluation/treatment. Below are the relevant portions of my assessment and plan of care. If you have questions, please do not hesitate to call me. I look forward to following Hal along with you.     Sincerely,      Prabhu Francisco MD

## 2022-03-18 ENCOUNTER — PROCEDURE VISIT (OUTPATIENT)
Dept: CARDIOLOGY CLINIC | Age: 77
End: 2022-03-18
Payer: MEDICARE

## 2022-03-18 ENCOUNTER — OFFICE VISIT (OUTPATIENT)
Dept: CARDIOLOGY CLINIC | Age: 77
End: 2022-03-18
Payer: MEDICARE

## 2022-03-18 VITALS
HEART RATE: 60 BPM | HEIGHT: 72 IN | WEIGHT: 144 LBS | OXYGEN SATURATION: 98 % | DIASTOLIC BLOOD PRESSURE: 60 MMHG | BODY MASS INDEX: 19.5 KG/M2 | SYSTOLIC BLOOD PRESSURE: 120 MMHG

## 2022-03-18 DIAGNOSIS — I65.23 OCCLUSION AND STENOSIS OF BILATERAL CAROTID ARTERIES: ICD-10-CM

## 2022-03-18 DIAGNOSIS — N18.30 STAGE 3 CHRONIC KIDNEY DISEASE, UNSPECIFIED WHETHER STAGE 3A OR 3B CKD (HCC): ICD-10-CM

## 2022-03-18 DIAGNOSIS — R00.1 BRADYCARDIA: ICD-10-CM

## 2022-03-18 DIAGNOSIS — Z94.0 HISTORY OF KIDNEY TRANSPLANT: ICD-10-CM

## 2022-03-18 DIAGNOSIS — I10 ESSENTIAL HYPERTENSION: ICD-10-CM

## 2022-03-18 DIAGNOSIS — E78.2 MIXED HYPERLIPIDEMIA: ICD-10-CM

## 2022-03-18 DIAGNOSIS — R07.9 CHEST PAIN, UNSPECIFIED TYPE: Primary | ICD-10-CM

## 2022-03-18 DIAGNOSIS — R07.89 OTHER CHEST PAIN: ICD-10-CM

## 2022-03-18 PROCEDURE — 99214 OFFICE O/P EST MOD 30 MIN: CPT | Performed by: INTERNAL MEDICINE

## 2022-03-18 PROCEDURE — G8420 CALC BMI NORM PARAMETERS: HCPCS | Performed by: INTERNAL MEDICINE

## 2022-03-18 PROCEDURE — G8427 DOCREV CUR MEDS BY ELIG CLIN: HCPCS | Performed by: INTERNAL MEDICINE

## 2022-03-18 PROCEDURE — 93015 CV STRESS TEST SUPVJ I&R: CPT | Performed by: INTERNAL MEDICINE

## 2022-03-18 PROCEDURE — 4040F PNEUMOC VAC/ADMIN/RCVD: CPT | Performed by: INTERNAL MEDICINE

## 2022-03-18 PROCEDURE — 1123F ACP DISCUSS/DSCN MKR DOCD: CPT | Performed by: INTERNAL MEDICINE

## 2022-03-18 PROCEDURE — G8484 FLU IMMUNIZE NO ADMIN: HCPCS | Performed by: INTERNAL MEDICINE

## 2022-03-18 PROCEDURE — 1036F TOBACCO NON-USER: CPT | Performed by: INTERNAL MEDICINE

## 2022-03-18 RX ORDER — ROSUVASTATIN CALCIUM 10 MG/1
10 TABLET, COATED ORAL DAILY
Qty: 90 TABLET | Refills: 4 | Status: SHIPPED | OUTPATIENT
Start: 2022-03-18

## 2022-03-18 RX ORDER — ROSUVASTATIN CALCIUM 10 MG/1
10 TABLET, COATED ORAL DAILY
Qty: 90 TABLET | Refills: 4 | Status: SHIPPED | OUTPATIENT
Start: 2022-03-18 | End: 2022-03-18 | Stop reason: SDUPTHER

## 2022-03-18 NOTE — PROGRESS NOTES
[facility]  Cardiac Consult     Referring Provider:  Daniela Guzman     Chief Complaint   Patient presents with    Hypertension     f/u stress    Hyperlipidemia    Bradycardia        History of Present Illness:  65 y.o. male formally followed by Dr. Janet Chiu seen in f/u for HTN and carotid disease. 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 He underwent craniectomy and resection. Followed by Dr. Eddie Baeza. Repeat MRI 12/2021 with left parietal mass. He has notice some left sided chest pain. Near anterior axillary line. \"Aches\" at time. Seems to occur with emotional stress. Occurring over last 4-5 months. Once per week. He has had chest pain in the past with multiple normal myoviews but last 2015. Due to this a GXT was ordered for today. Misbah protocol. He says he had no chest pain. No ischemic changes but did not reach target. Past Medical History:   has a past medical history of Diabetes mellitus ([address] Hyperlipidemia, Hypertension, Kidney failure, Shingles, and Skin cancer. Surgical History:   has a past surgical history that includes hernia repair; Gastric bypass surgery; Appendectomy; Cholecystectomy; Cataract removal; Kidney removal; Prostate biopsy; and Vocal Cord Surgery (01/2021). Social History:  Social History     Tobacco Use    Smoking status: Former Smoker    Smokeless tobacco: Never Used   Substance Use Topics    Alcohol use: Yes     Comment: SELDOM        Family History:  family history includes Diabetes in his brother. Allergies:  Patient has no known allergies. Home Medications:  Prior to Visit Medications    Medication Sig Taking? Authorizing Provider   dicyclomine (BENTYL) 10 MG capsule Take 10 mg by mouth as needed Yes Historical Provider, MD   bevacizumab (AVASTIN) 2.5 mg/0.1 ml syringe Inject 2.5 mg into the eye once Shot in right eye every 4 wks.  Yes Historical Provider, MD   omeprazole (PRILOSEC) 20 MG delayed release capsule Take 20 mg by mouth 2 times daily Yes Historical Provider, MD   EPOETIN ESTEBAN IJ Inject as directed Indications: not sure on dosage Yes Historical Provider, MD   calcium carbonate (OYSTER SHELL CALCIUM 500 MG) 1250 (500 CA) MG tablet Take 1 tablet by mouth daily Yes Historical Provider, MD   acetaminophen (TYLENOL) 325 MG tablet Take 650 mg by mouth every 6 hours as needed for Pain Yes Historical Provider, MD   gabapentin (NEURONTIN) 100 MG capsule Take 100 mg by mouth 3 times daily Yes Historical Provider, MD   Multiple Vitamins-Minerals (OCUVITE PRESERVISION PO) Take 1 tablet by mouth 2 times daily Yes Historical Provider, MD   sirolimus (RAPAMUNE) 1 MG tablet Take 1 mg by mouth 4 times daily  Yes Historical Provider, MD   hydrochlorothiazide (HYDRODIURIL) 25 MG tablet Take 12.5 mg by mouth daily. Yes Historical Provider, MD   Cholecalciferol (VITAMIN D3) 2000 UNITS CAPS Take by mouth daily  Yes Historical Provider, MD   aspirin EC 81 MG EC tablet Take 1 tablet by mouth daily. Yes Adán Mccoy MD   Multiple Vitamins-Minerals (MULTIVITAMIN,TX-MINERALS) tablet Take 1 tablet by mouth daily. Yes Historical Provider, MD       [x] Medications and dosages reviewed.     ROS:  [x]Full ROS obtained and negative except as mentioned in HPI      Physical Examination:    Vitals:    03/18/22 1015   BP: 120/60   Site: Right Upper Arm   Position: Sitting   Cuff Size: Medium Adult   Pulse: 60   SpO2: 98%   Weight: 144 lb (65.3 kg)   Height: 6' (1.829 m)        · GENERAL: Well developed, well nourished, No acute distress  · NEUROLOGICAL: Alert and oriented  · PSYCH: Calm affect  · SKIN: Warm and dry, No visible rash,   · EYES: Pupils equal and round, Sclera non-icteric,   · HENT:  External ears and nose unremarkable, mucus membranes moist  · MUSCULOSKELETAL: Normal cephalic, neck supple  · CAROTID: Normal upstroke, no bruits  · CARDIAC: JVP normal, Normal PMI, regular bradycardic rate and rhythm, normal S1S2, no murmur, rub, or gallop  · RESPIRATORY: Normal respiratory effort, clear to auscultation bilaterally  · EXTREMITIES: No LE edema  · GASTROINTESTINAL: normal bowel sounds, soft, non-tender, No hepatomegaly     Carotid Doppler 1/2022  Estimated diameter reduction of the right internal carotid artery and   bulb  is <50%. Estimated diameter reduction of the left internal carotid artery and   bulb is <50%. MRI 12/2021  Continued slight interval decrease in size of left parietal mass with stable surrounding mild vasogenic edema versus gliosis. Stable post surgical changes in the right cerebellar hemisphere. Stable atrophy and chronic white matter signal abnormality. Interval worsening of severe bilateral mastoid inflammatory change with stable and shoulder changes in the paranasal sinuses. .    EKG: Sinus bradycardia    GXT:     No ischemic EKG changes. Nondiagnostic due to failure to reach target heart rate. CREAT: 2.13      ASSESSMENT:      HTN:  Well controlled  /60 (Site: Right Upper Arm, Position: Sitting, Cuff Size: Medium Adult)   Pulse 60   Ht 6' (1.829 m)   Wt 144 lb (65.3 kg)   SpO2 98%   BMI 19.53 kg/m²   Coreg stopped due to bradycardia  Continue HCTZ    Hyperlipidemia:  HIJ=162. Not on statin  Start crestor  Goal LDL<70 for possible CAD    Carotid Stenosis:  50-69 2020  [facility] check through [facility]  He will call to get result and have Dr. Liz Bolton follow    CRI:   Stable.  Creat=1.86  Followed by renal  S/p transplant    Renal Transplant  10 yrs ago  Followed by nephrology  Stable    Bradycardia:  Stopped coreg  Minimal heart rate response on treadmill-Chronotropic incompetence, but minimally symptomatic as inactive with multiple medical issues  follow    Chest Pain:  Atypical. No ischemic on GXT but non-diagnostic due to HR  Discussed myoview but he does not want at this time  Interventional options would be limited with renal dysfunction and prior transplant as well as brain mass  Add crestor to treat medically for possible CAD      Plan:  F/u 3 months  Call if worsening chest pain and will plan lexiscan, but treatment options if he has CAD will be limited.   Crestor 10 mg (check with transplant to be sure it is OK, but interaction check says no interaction)  F/u 3 months with lipids      Thank you for allowing me to participate in the care of this individual.      Carlos Manuel Cortes M.D., [facility]

## 2022-03-18 NOTE — PATIENT INSTRUCTIONS
Ask your kidney doctor if it is okay for you to take Crestor 10 mg daily  Fasting labs in 3 months to recheck cholesterol  Follow up in 3 months

## 2022-03-18 NOTE — LETTER
[address]  Cardiac Consult     Referring Provider:  Ana Rob     Chief Complaint   Patient presents with    Hypertension     f/u stress    Hyperlipidemia    Bradycardia        History of Present Illness:  65 y.o. male formally followed by Dr. Yosi Garner seen in f/u for HTN and carotid disease. 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 He underwent craniectomy and resection. Followed by Dr. Liliana Conklin. Repeat MRI 12/2021 with left parietal mass. He has notice some left sided chest pain. Near anterior axillary line. \"Aches\" at time. Seems to occur with emotional stress. Occurring over last 4-5 months. Once per week. He has had chest pain in the past with multiple normal myoviews but last 2015. Due to this a GXT was ordered for today. Misbah protocol. He says he had no chest pain. No ischemic changes but did not reach target. Past Medical History:   has a past medical history of Diabetes mellitus ([address] Hyperlipidemia, Hypertension, Kidney failure, Shingles, and Skin cancer. Surgical History:   has a past surgical history that includes hernia repair; Gastric bypass surgery; Appendectomy; Cholecystectomy; Cataract removal; Kidney removal; Prostate biopsy; and Vocal Cord Surgery (01/2021). Social History:  Social History     Tobacco Use    Smoking status: Former Smoker    Smokeless tobacco: Never Used   Substance Use Topics    Alcohol use: Yes     Comment: SELDOM        Family History:  family history includes Diabetes in his brother. Allergies:  Patient has no known allergies. Home Medications:  Prior to Visit Medications    Medication Sig Taking? Authorizing Provider   dicyclomine (BENTYL) 10 MG capsule Take 10 mg by mouth as needed Yes Historical Provider, MD   bevacizumab (AVASTIN) 2.5 mg/0.1 ml syringe Inject 2.5 mg into the eye once Shot in right eye every 4 wks.  Yes Historical Provider, MD   omeprazole (PRILOSEC) 20 MG delayed release capsule Take 20 mg by mouth 2 times daily Yes Historical Provider, MD   EPOETIN ESTEBAN IJ Inject as directed Indications: not sure on dosage Yes Historical Provider, MD   calcium carbonate (OYSTER SHELL CALCIUM 500 MG) 1250 (500 CA) MG tablet Take 1 tablet by mouth daily Yes Historical Provider, MD   acetaminophen (TYLENOL) 325 MG tablet Take 650 mg by mouth every 6 hours as needed for Pain Yes Historical Provider, MD   gabapentin (NEURONTIN) 100 MG capsule Take 100 mg by mouth 3 times daily Yes Historical Provider, MD   Multiple Vitamins-Minerals (OCUVITE PRESERVISION PO) Take 1 tablet by mouth 2 times daily Yes Historical Provider, MD   sirolimus (RAPAMUNE) 1 MG tablet Take 1 mg by mouth 4 times daily  Yes Historical Provider, MD   hydrochlorothiazide (HYDRODIURIL) 25 MG tablet Take 12.5 mg by mouth daily. Yes Historical Provider, MD   Cholecalciferol (VITAMIN D3) 2000 UNITS CAPS Take by mouth daily  Yes Historical Provider, MD   aspirin EC 81 MG EC tablet Take 1 tablet by mouth daily. Yes Lauri Nobles MD   Multiple Vitamins-Minerals (MULTIVITAMIN,TX-MINERALS) tablet Take 1 tablet by mouth daily. Yes Historical Provider, MD       [x] Medications and dosages reviewed.     ROS:  [x]Full ROS obtained and negative except as mentioned in HPI      Physical Examination:    Vitals:    03/18/22 1015   BP: 120/60   Site: Right Upper Arm   Position: Sitting   Cuff Size: Medium Adult   Pulse: 60   SpO2: 98%   Weight: 144 lb (65.3 kg)   Height: 6' (1.829 m)        · GENERAL: Well developed, well nourished, No acute distress  · NEUROLOGICAL: Alert and oriented  · PSYCH: Calm affect  · SKIN: Warm and dry, No visible rash,   · EYES: Pupils equal and round, Sclera non-icteric,   · HENT:  External ears and nose unremarkable, mucus membranes moist  · MUSCULOSKELETAL: Normal cephalic, neck supple  · CAROTID: Normal upstroke, no bruits  · CARDIAC: JVP normal, Normal PMI, regular bradycardic rate and rhythm, normal S1S2, no murmur, rub, or gallop  · RESPIRATORY: Normal respiratory effort, clear to auscultation bilaterally  · EXTREMITIES: No LE edema  · GASTROINTESTINAL: normal bowel sounds, soft, non-tender, No hepatomegaly     Carotid Doppler 1/2022  Estimated diameter reduction of the right internal carotid artery and   bulb  is <50%. Estimated diameter reduction of the left internal carotid artery and   bulb is <50%. MRI 12/2021  Continued slight interval decrease in size of left parietal mass with stable surrounding mild vasogenic edema versus gliosis. Stable post surgical changes in the right cerebellar hemisphere. Stable atrophy and chronic white matter signal abnormality. Interval worsening of severe bilateral mastoid inflammatory change with stable and shoulder changes in the paranasal sinuses. .    EKG: Sinus bradycardia    GXT:     No ischemic EKG changes. Nondiagnostic due to failure to reach target heart rate. CREAT: 2.13      ASSESSMENT:      HTN:  Well controlled  /60 (Site: Right Upper Arm, Position: Sitting, Cuff Size: Medium Adult)   Pulse 60   Ht 6' (1.829 m)   Wt 144 lb (65.3 kg)   SpO2 98%   BMI 19.53 kg/m²   Coreg stopped due to bradycardia  Continue HCTZ    Hyperlipidemia:  KSF=398. Not on statin  Start crestor  Goal LDL<70 for possible CAD    Carotid Stenosis:  50-69 2020  Recent check through [address]  He will call to get result and have Dr. Fernando Richardson follow    CRI:   Stable.  Creat=1.86  Followed by renal  S/p transplant    Renal Transplant  10 yrs ago  Followed by nephrology  Stable    Bradycardia:  Stopped coreg  Minimal heart rate response on treadmill-Chronotropic incompetence, but minimally symptomatic as inactive with multiple medical issues  follow    Chest Pain:  Atypical. No ischemic on GXT but non-diagnostic due to HR  Discussed myoview but he does not want at this time  Interventional options would be limited with renal dysfunction and prior transplant as well as brain mass  Add crestor to treat medically for possible CAD      Plan:  F/u 3 months  Call if worsening chest pain and will plan lexiscan, but treatment options if he has CAD will be limited.   Crestor 10 mg (check with transplant to be sure it is OK, but interaction check says no interaction)  F/u 3 months with lipids      Thank you for allowing me to participate in the care of this individual.      Carlos Manuel Cortes M.D., [facility] [place]

## 2022-04-02 ASSESSMENT — VISUAL ACUITY
OS_GLARE: 20/200
OS_PH: SC 20/40 -2
OS_CC: 20/400
OD_PH: SC 20/40
OD_CC: 20/60
OD_CC: 20/30
OS_CC: 20/60-1

## 2022-06-23 ENCOUNTER — OFFICE VISIT (OUTPATIENT)
Dept: CARDIOLOGY CLINIC | Age: 77
End: 2022-06-23
Payer: MEDICARE

## 2022-06-23 VITALS
OXYGEN SATURATION: 98 % | BODY MASS INDEX: 19.33 KG/M2 | SYSTOLIC BLOOD PRESSURE: 136 MMHG | HEIGHT: 72 IN | DIASTOLIC BLOOD PRESSURE: 78 MMHG | HEART RATE: 83 BPM | WEIGHT: 142.7 LBS

## 2022-06-23 DIAGNOSIS — I10 ESSENTIAL HYPERTENSION: ICD-10-CM

## 2022-06-23 DIAGNOSIS — I65.23 OCCLUSION AND STENOSIS OF BILATERAL CAROTID ARTERIES: ICD-10-CM

## 2022-06-23 DIAGNOSIS — Z94.0 HISTORY OF KIDNEY TRANSPLANT: ICD-10-CM

## 2022-06-23 DIAGNOSIS — R00.1 BRADYCARDIA: ICD-10-CM

## 2022-06-23 DIAGNOSIS — R07.9 CHEST PAIN, UNSPECIFIED TYPE: Primary | ICD-10-CM

## 2022-06-23 DIAGNOSIS — E78.2 MIXED HYPERLIPIDEMIA: ICD-10-CM

## 2022-06-23 PROCEDURE — G8427 DOCREV CUR MEDS BY ELIG CLIN: HCPCS | Performed by: INTERNAL MEDICINE

## 2022-06-23 PROCEDURE — G8420 CALC BMI NORM PARAMETERS: HCPCS | Performed by: INTERNAL MEDICINE

## 2022-06-23 PROCEDURE — 99214 OFFICE O/P EST MOD 30 MIN: CPT | Performed by: INTERNAL MEDICINE

## 2022-06-23 PROCEDURE — 1123F ACP DISCUSS/DSCN MKR DOCD: CPT | Performed by: INTERNAL MEDICINE

## 2022-06-23 PROCEDURE — 1036F TOBACCO NON-USER: CPT | Performed by: INTERNAL MEDICINE

## 2022-06-23 RX ORDER — CARVEDILOL 6.25 MG/1
6.25 TABLET ORAL 2 TIMES DAILY
COMMUNITY

## 2022-06-23 RX ORDER — PANTOPRAZOLE SODIUM 20 MG/1
20 TABLET, DELAYED RELEASE ORAL 2 TIMES DAILY
COMMUNITY

## 2022-06-23 RX ORDER — CHLORHEXIDINE GLUCONATE 0.12 MG/ML
RINSE ORAL
COMMUNITY
Start: 2022-01-31

## 2022-06-23 NOTE — PROGRESS NOTES
[address]  Cardiac Consult     Referring Provider:  Ivet Gagnon     Chief Complaint   Patient presents with    Hypertension    Hyperlipidemia    3 Month Follow-Up        History of Present Illness:  65 y.o. male formally followed by Dr. Surya Herrera seen in f/u for HTN and carotid disease. 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 He underwent craniectomy and resection. Followed by Dr. Yesenia Kwon. Repeat MRI 12/2021 with left parietal mass. He has notice some left sided chest pain. Near anterior axillary line. \"Aches\" at time. Seems to occur with emotional stress. Occurring over last 4-5 months. Once per week. He has had chest pain in the past with multiple normal myoviews but last 2015. GXT negative 2022    He is doing OK. No further chest discomfort. Does light walking. He sees nephrology q 3 months and they follow renal function. Past Medical History:   has a past medical history of Diabetes mellitus ([address] Hyperlipidemia, Hypertension, Kidney failure, Shingles, and Skin cancer. Surgical History:   has a past surgical history that includes hernia repair; Gastric bypass surgery; Appendectomy; Cholecystectomy; Cataract removal; Kidney removal; Prostate biopsy; and Vocal Cord Surgery (01/2021). Social History:  Social History     Tobacco Use    Smoking status: Former Smoker    Smokeless tobacco: Never Used   Substance Use Topics    Alcohol use: Yes     Comment: SELDOM        Family History:  family history includes Diabetes in his brother. Allergies:  Patient has no known allergies. Home Medications:  Prior to Visit Medications    Medication Sig Taking? Authorizing Provider   chlorhexidine (PERIDEX) 0.12 % solution RINSE MOUTH WITH SWISH 15 ML AS INSTRUCTED TWICE A DAY FOR ONE MINUTE THEN SPIT. NO EATING/DRINKING/RINSING WITH WATER FOR 30 MIN.  Yes Historical Provider, MD   pantoprazole (PROTONIX) 20 MG tablet Take 20 mg by mouth 2 times daily Yes Historical Provider, MD   Emollient (COCOA BUTTER) LOTN Apply topically as needed Yes Historical Provider, MD   carvedilol (COREG) 6.25 MG tablet Take 6.25 mg by mouth 2 times daily Yes Historical Provider, MD   rosuvastatin (CRESTOR) 10 MG tablet Take 1 tablet by mouth daily Yes Laurence Peterson MD   dicyclomine (BENTYL) 10 MG capsule Take 10 mg by mouth as needed Yes Historical Provider, MD   bevacizumab (AVASTIN) 2.5 mg/0.1 ml syringe Inject 2.5 mg into the eye once Shot in right eye every 4 wks. Yes Historical Provider, MD   omeprazole (PRILOSEC) 20 MG delayed release capsule Take 20 mg by mouth 2 times daily Yes Historical Provider, MD   EPOETIN ESTEBAN IJ Inject as directed Indications: not sure on dosage Yes Historical Provider, MD   calcium carbonate (OYSTER SHELL CALCIUM 500 MG) 1250 (500 CA) MG tablet Take 1 tablet by mouth daily Yes Historical Provider, MD   acetaminophen (TYLENOL) 325 MG tablet Take 650 mg by mouth every 6 hours as needed for Pain Yes Historical Provider, MD   gabapentin (NEURONTIN) 100 MG capsule Take 100 mg by mouth 3 times daily Yes Historical Provider, MD   Multiple Vitamins-Minerals (OCUVITE PRESERVISION PO) Take 1 tablet by mouth 2 times daily Yes Historical Provider, MD   sirolimus (RAPAMUNE) 1 MG tablet Take 1 mg by mouth 4 times daily  Yes Historical Provider, MD   hydrochlorothiazide (HYDRODIURIL) 25 MG tablet Take 12.5 mg by mouth daily. Yes Historical Provider, MD   aspirin EC 81 MG EC tablet Take 1 tablet by mouth daily. Yes Slade Vealzquez MD   Multiple Vitamins-Minerals (MULTIVITAMIN,TX-MINERALS) tablet Take 1 tablet by mouth daily. Yes Historical Provider, MD       [x] Medications and dosages reviewed.     ROS:  [x]Full ROS obtained and negative except as mentioned in HPI      Physical Examination:    Vitals:    06/23/22 1055   BP: 136/78   Site: Right Upper Arm   Position: Sitting   Cuff Size: Medium Adult   Pulse: 83   SpO2: 98%   Weight: 142 lb 11.2 oz (64.7 kg)   Height: 6' (1.829 m) · GENERAL: Well developed, well nourished, No acute distress  · NEUROLOGICAL: Alert and oriented  · PSYCH: Calm affect  · SKIN: Warm and dry, No visible rash,   · EYES: Pupils equal and round, Sclera non-icteric,   · HENT:  External ears and nose unremarkable, mucus membranes moist  · MUSCULOSKELETAL: Normal cephalic, neck supple  · CAROTID: Normal upstroke, no bruits  · CARDIAC: JVP normal, Normal PMI, regular bradycardic rate and rhythm, normal S1S2, no murmur, rub, or gallop  · RESPIRATORY: Normal respiratory effort, clear to auscultation bilaterally  · EXTREMITIES: No LE edema  · GASTROINTESTINAL: normal bowel sounds, soft, non-tender, No hepatomegaly     Carotid Doppler 1/2022  Estimated diameter reduction of the right internal carotid artery and   bulb  is <50%. Estimated diameter reduction of the left internal carotid artery and   bulb is <50%. MRI 12/2021  Continued slight interval decrease in size of left parietal mass with stable surrounding mild vasogenic edema versus gliosis. Stable post surgical changes in the right cerebellar hemisphere. Stable atrophy and chronic white matter signal abnormality. Interval worsening of severe bilateral mastoid inflammatory change with stable and shoulder changes in the paranasal sinuses. .    EKG: Sinus bradycardia    GXT: 3/2022     No ischemic EKG changes. Nondiagnostic due to failure to reach target heart rate. CREAT: 2.13      ASSESSMENT:      HTN:  Well controlled  /78 (Site: Right Upper Arm, Position: Sitting, Cuff Size: Medium Adult)   Pulse 83   Ht 6' (1.829 m)   Wt 142 lb 11.2 oz (64.7 kg)   SpO2 98%   BMI 19.35 kg/m²   Coreg stopped due to bradycardia  Continue HCTZ. K+=3.3 on last check. Followed by nephrology. F/u planned    Hyperlipidemia:  ZOV=243. Not on statin  Started crestor 10  Goal LDL<70 for possible CAD  Check lipids    Carotid Stenosis:  50-69 2020  < 50% bilateral 1/2022    CRI:   Stable.  Creat=2.17  Followed by renal  S/p transplant    Renal Transplant  10 yrs ago  Followed by nephrology  Stable    Bradycardia:  Stopped coreg  Minimal heart rate response on treadmill-Chronotropic incompetence, but minimally symptomatic as inactive with multiple medical issues  follow    Chest Pain:  Resolved.  Follow  Atypical. No ischemic on GXT but non-diagnostic due to HR  Interventional options would be limited with renal dysfunction and prior transplant as well as brain mass  Add crestor to treat medically for possible CAD      Plan:  Check lipids  F/u Oncology and nephrology as planned  F/u me 6 months      Thank you for allowing me to participate in the care of this individual.      Juanito Pepe M.D., [facility] [place]

## 2022-06-23 NOTE — LETTER
[address] Cardiology [facility] [address]  Phone: [phone]  Fax: [phone]    Lino Lugo MD    June 23, 2022     Barahonariley Mac  [facility]. [address] [place] [address]    Patient: Lasandra Cushing   MR Number: [id number]   YOB: 1945   Date of Visit: 6/23/2022       Dear Jun Mac:    Thank you for referring Ami Luna to me for evaluation/treatment. Below are the relevant portions of my assessment and plan of care. [address]  Cardiac Consult     Referring Provider:  Jun Mac     Chief Complaint   Patient presents with    Hypertension    Hyperlipidemia    3 Month Follow-Up        History of Present Illness:  65 y.o. male formally followed by Dr. Breann Huerta seen in f/u for HTN and carotid disease. 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 He underwent craniectomy and resection. Followed by Dr. Gerson Biswas. Repeat MRI 12/2021 with left parietal mass. He has notice some left sided chest pain. Near anterior axillary line. \"Aches\" at time. Seems to occur with emotional stress. Occurring over last 4-5 months. Once per week. He has had chest pain in the past with multiple normal myoviews but last 2015. GXT negative 2022    He is doing OK. No further chest discomfort. Does light walking. He sees nephrology q 3 months and they follow renal function. Past Medical History:   has a past medical history of Diabetes mellitus ([address] Hyperlipidemia, Hypertension, Kidney failure, Shingles, and Skin cancer. Surgical History:   has a past surgical history that includes hernia repair; Gastric bypass surgery; Appendectomy; Cholecystectomy; Cataract removal; Kidney removal; Prostate biopsy; and Vocal Cord Surgery (01/2021).      Social History:  Social History     Tobacco Use    Smoking status: Former Smoker    Smokeless tobacco: Never Used   Substance Use Topics    Alcohol use: Yes     Comment: SELDOM        Family History:  family history includes Diabetes in his brother. Allergies:  Patient has no known allergies. Home Medications:  Prior to Visit Medications    Medication Sig Taking? Authorizing Provider   chlorhexidine (PERIDEX) 0.12 % solution RINSE MOUTH WITH SWISH 15 ML AS INSTRUCTED TWICE A DAY FOR ONE MINUTE THEN SPIT. NO EATING/DRINKING/RINSING WITH WATER FOR 30 MIN. Yes Historical Provider, MD   pantoprazole (PROTONIX) 20 MG tablet Take 20 mg by mouth 2 times daily Yes Historical Provider, MD   Emollient (COCOA BUTTER) LOTN Apply topically as needed Yes Historical Provider, MD   carvedilol (COREG) 6.25 MG tablet Take 6.25 mg by mouth 2 times daily Yes Historical Provider, MD   rosuvastatin (CRESTOR) 10 MG tablet Take 1 tablet by mouth daily Yes Niurka Haywood MD   dicyclomine (BENTYL) 10 MG capsule Take 10 mg by mouth as needed Yes Historical Provider, MD   bevacizumab (AVASTIN) 2.5 mg/0.1 ml syringe Inject 2.5 mg into the eye once Shot in right eye every 4 wks. Yes Historical Provider, MD   omeprazole (PRILOSEC) 20 MG delayed release capsule Take 20 mg by mouth 2 times daily Yes Historical Provider, MD   EPOETIN ESTEBAN IJ Inject as directed Indications: not sure on dosage Yes Historical Provider, MD   calcium carbonate (OYSTER SHELL CALCIUM 500 MG) 1250 (500 CA) MG tablet Take 1 tablet by mouth daily Yes Historical Provider, MD   acetaminophen (TYLENOL) 325 MG tablet Take 650 mg by mouth every 6 hours as needed for Pain Yes Historical Provider, MD   gabapentin (NEURONTIN) 100 MG capsule Take 100 mg by mouth 3 times daily Yes Historical Provider, MD   Multiple Vitamins-Minerals (OCUVITE PRESERVISION PO) Take 1 tablet by mouth 2 times daily Yes Historical Provider, MD   sirolimus (RAPAMUNE) 1 MG tablet Take 1 mg by mouth 4 times daily  Yes Historical Provider, MD   hydrochlorothiazide (HYDRODIURIL) 25 MG tablet Take 12.5 mg by mouth daily. Yes Historical Provider, MD   aspirin EC 81 MG EC tablet Take 1 tablet by mouth daily.  Yes Steven Christopher MD Jenny   Multiple Vitamins-Minerals (MULTIVITAMIN,TX-MINERALS) tablet Take 1 tablet by mouth daily. Yes Historical Provider, MD       [x] Medications and dosages reviewed. ROS:  [x]Full ROS obtained and negative except as mentioned in HPI      Physical Examination:    Vitals:    06/23/22 1055   BP: 136/78   Site: Right Upper Arm   Position: Sitting   Cuff Size: Medium Adult   Pulse: 83   SpO2: 98%   Weight: 142 lb 11.2 oz (64.7 kg)   Height: 6' (1.829 m)        · GENERAL: Well developed, well nourished, No acute distress  · NEUROLOGICAL: Alert and oriented  · PSYCH: Calm affect  · SKIN: Warm and dry, No visible rash,   · EYES: Pupils equal and round, Sclera non-icteric,   · HENT:  External ears and nose unremarkable, mucus membranes moist  · MUSCULOSKELETAL: Normal cephalic, neck supple  · CAROTID: Normal upstroke, no bruits  · CARDIAC: JVP normal, Normal PMI, regular bradycardic rate and rhythm, normal S1S2, no murmur, rub, or gallop  · RESPIRATORY: Normal respiratory effort, clear to auscultation bilaterally  · EXTREMITIES: No LE edema  · GASTROINTESTINAL: normal bowel sounds, soft, non-tender, No hepatomegaly     Carotid Doppler 1/2022  Estimated diameter reduction of the right internal carotid artery and   bulb  is <50%. Estimated diameter reduction of the left internal carotid artery and   bulb is <50%. MRI 12/2021  Continued slight interval decrease in size of left parietal mass with stable surrounding mild vasogenic edema versus gliosis. Stable post surgical changes in the right cerebellar hemisphere. Stable atrophy and chronic white matter signal abnormality. Interval worsening of severe bilateral mastoid inflammatory change with stable and shoulder changes in the paranasal sinuses. .    EKG: Sinus bradycardia    GXT: 3/2022     No ischemic EKG changes. Nondiagnostic due to failure to reach target heart rate.     CREAT: 2.13      ASSESSMENT:      HTN:  Well controlled  /78 (Site: Right Upper Arm, Position: Sitting, Cuff Size: Medium Adult)   Pulse 83   Ht 6' (1.829 m)   Wt 142 lb 11.2 oz (64.7 kg)   SpO2 98%   BMI 19.35 kg/m²   Coreg stopped due to bradycardia  Continue HCTZ. K+=3.3 on last check. Followed by nephrology. F/u planned    Hyperlipidemia:  MXF=515. Not on statin  Started crestor 10  Goal LDL<70 for possible CAD  Check lipids    Carotid Stenosis:  50-69 2020  < 50% bilateral 1/2022    CRI:   Stable. Creat=2.17  Followed by renal  S/p transplant    Renal Transplant  10 yrs ago  Followed by nephrology  Stable    Bradycardia:  Stopped coreg  Minimal heart rate response on treadmill-Chronotropic incompetence, but minimally symptomatic as inactive with multiple medical issues  follow    Chest Pain:  Resolved. Follow  Atypical. No ischemic on GXT but non-diagnostic due to HR  Interventional options would be limited with renal dysfunction and prior transplant as well as brain mass  Add crestor to treat medically for possible CAD      Plan:  Check lipids  F/u Oncology and nephrology as planned  F/u me 6 months      Thank you for allowing me to participate in the care of this individual.      Mallory Tena M.D., [facility] [place]              If you have questions, please do not hesitate to call me. I look forward to following Hal along with you.     Sincerely,      Bo Aviles MD

## 2022-06-24 ENCOUNTER — TELEPHONE (OUTPATIENT)
Dept: CARDIOLOGY CLINIC | Age: 77
End: 2022-06-24

## 2022-06-24 LAB
ALT SERPL-CCNC: 24 IU/L (ref 10–50)
AST SERPL-CCNC: 34 IU/L (ref 10–50)
CHOLESTEROL, TOTAL: 140 MG/DL
HDLC SERPL-MCNC: 45 MG/DL
LDL CHOLESTEROL CALCULATED: 75 MG/DL
NONHDLC SERPL-MCNC: 95 MG/DL
TRIGL SERPL-MCNC: 99 MG/DL

## 2022-06-24 NOTE — TELEPHONE ENCOUNTER
Spoke with the patient and advised him of the results below per [facility]. Patient voiced understanding .  Call complete

## 2022-06-24 NOTE — TELEPHONE ENCOUNTER
----- Message from Jerzy Marques MD sent at 6/24/2022  3:41 PM EDT -----  Tell pt. their labs are good. F/u in clinic as planned.       [facility]